# Patient Record
Sex: MALE | Race: WHITE | Employment: FULL TIME | URBAN - METROPOLITAN AREA
[De-identification: names, ages, dates, MRNs, and addresses within clinical notes are randomized per-mention and may not be internally consistent; named-entity substitution may affect disease eponyms.]

---

## 2022-05-06 ENCOUNTER — HOSPITAL ENCOUNTER (EMERGENCY)
Age: 47
Discharge: HOME | End: 2022-05-07
Payer: COMMERCIAL

## 2022-05-06 VITALS
TEMPERATURE: 98.1 F | HEIGHT: 71 IN | RESPIRATION RATE: 20 BRPM | WEIGHT: 315 LBS | OXYGEN SATURATION: 94 % | SYSTOLIC BLOOD PRESSURE: 145 MMHG | HEART RATE: 99 BPM | BODY MASS INDEX: 44.1 KG/M2 | DIASTOLIC BLOOD PRESSURE: 89 MMHG

## 2022-05-06 DIAGNOSIS — S81.011A LACERATION OF RIGHT KNEE, INITIAL ENCOUNTER: Primary | ICD-10-CM

## 2022-05-06 PROCEDURE — 99284 EMERGENCY DEPT VISIT MOD MDM: CPT

## 2022-05-06 PROCEDURE — 90715 TDAP VACCINE 7 YRS/> IM: CPT | Performed by: NURSE PRACTITIONER

## 2022-05-06 PROCEDURE — 6360000002 HC RX W HCPCS: Performed by: NURSE PRACTITIONER

## 2022-05-06 PROCEDURE — 12002 RPR S/N/AX/GEN/TRNK2.6-7.5CM: CPT

## 2022-05-06 PROCEDURE — 90471 IMMUNIZATION ADMIN: CPT | Performed by: NURSE PRACTITIONER

## 2022-05-06 RX ORDER — VENLAFAXINE HYDROCHLORIDE 150 MG/1
150 CAPSULE, EXTENDED RELEASE ORAL DAILY
COMMUNITY

## 2022-05-06 RX ORDER — ARIPIPRAZOLE 10 MG/1
10 TABLET ORAL DAILY
COMMUNITY

## 2022-05-06 RX ORDER — ALPRAZOLAM 1 MG/1
1 TABLET ORAL 2 TIMES DAILY
COMMUNITY

## 2022-05-06 RX ADMIN — TETANUS TOXOID, REDUCED DIPHTHERIA TOXOID AND ACELLULAR PERTUSSIS VACCINE, ADSORBED 0.5 ML: 5; 2.5; 8; 8; 2.5 SUSPENSION INTRAMUSCULAR at 23:19

## 2022-05-06 NOTE — Clinical Note
Nick Alvarez was seen and treated in our emergency department on 5/6/2022. He may return to work on 05/08/2022. If you have any questions or concerns, please don't hesitate to call.       Natalie Vallejo, ROHAN - CNP

## 2022-05-07 ASSESSMENT — ENCOUNTER SYMPTOMS
CHEST TIGHTNESS: 0
VOMITING: 0
SHORTNESS OF BREATH: 0
DIARRHEA: 0
NAUSEA: 0
ABDOMINAL PAIN: 0

## 2022-05-07 NOTE — ED NOTES
Discharge and education instructions reviewed. Patient verbalized understanding, teach-back successful. Patient denied questions at this time. No acute distress noted. Patient instructed to follow-up as noted - return to emergency department if symptoms worsen. Patient verbalized understanding. Discharged per EDMD with discharged instructions. Patient discharged with knee immobilizer on R knee.        Margi Luo RN  05/06/22 6169

## 2022-05-07 NOTE — ED PROVIDER NOTES
[address] [facility]        Pt Name: Darren Molina  MRN: [id number]  [place] 1975  Date of evaluation: 5/6/2022  Provider: ROHAN Belle CNP  PCP: No primary care provider on file. Note Started: 1:18 AM EDT       ARLENE. I have evaluated this patient. My supervising physician was available for consultation. CHIEF COMPLAINT       Chief Complaint   Patient presents with    Laceration     pt hit right knee on a glass coffee table and has a laceration to knee        HISTORY OF PRESENT ILLNESS   (Location, Timing/Onset, Context/Setting, Quality, Duration, Modifying Factors, Severity, Associated Signs and Symptoms)  Note limiting factors. Chief Complaint: laceration to the right knee     Darren Molina is a 40 y.o. male who presents to the emergency department with 5 cm laceration to the right knee. The patient reports that he bumped into a glass coffee table, causing a jagged edge and causing laceration. No concern for foreign body. Uncertain of tetanus status. Bleeding is controlled. Mild to no pain. Laceration is gaping. He is ambulatory    Denies any headache, fever, lightheadedness, dizziness, visual disturbances. No chest pain or pressure. No neck or back pain. No shortness of breath, cough, or congestion. No abdominal pain, nausea, vomiting, diarrhea, constipation, or dysuria. No rash. Nursing Notes were all reviewed and agreed with or any disagreements were addressed in the HPI. REVIEW OF SYSTEMS    (2-9 systems for level 4, 10 or more for level 5)     Review of Systems   Constitutional: Negative for activity change, chills and fever. Respiratory: Negative for chest tightness and shortness of breath. Cardiovascular: Negative for chest pain. Gastrointestinal: Negative for abdominal pain, diarrhea, nausea and vomiting. Genitourinary: Negative for dysuria. Skin: Positive for wound.    All other systems reviewed and are negative. Positives and Pertinent negatives as per HPI. Except as noted above in the ROS, all other systems were reviewed and negative. PAST MEDICAL HISTORY     Past Medical History:   Diagnosis Date    Bipolar 1 disorder ([facility] [address]     Depression          SURGICAL HISTORY   History reviewed. No pertinent surgical history. [address]       Discharge Medication List as of 5/6/2022 11:53 PM      CONTINUE these medications which have NOT CHANGED    Details   ARIPiprazole (ABILIFY) 10 MG tablet Take 10 mg by mouth dailyHistorical Med      venlafaxine (EFFEXOR XR) 150 MG extended release capsule Take 150 mg by mouth dailyHistorical Med      ALPRAZolam (XANAX) 1 MG tablet Take 1 mg by mouth 2 times daily. Historical Med               ALLERGIES     Patient has no known allergies. FAMILYHISTORY     History reviewed. No pertinent family history. SOCIAL HISTORY       Social History     Tobacco Use    Smoking status: Current Every Day Smoker     Packs/day: 2.00     Types: Cigarettes    Smokeless tobacco: Never Used   Substance Use Topics    Alcohol use: Not Currently    Drug use: Never       SCREENINGS    Story Coma Scale  Eye Opening: Spontaneous  Best Verbal Response: Oriented  Best Motor Response: Obeys commands  Maeve Coma Scale Score: 15        PHYSICAL EXAM    (up to 7 for level 4, 8 or more for level 5)     ED Triage Vitals [05/06/22 2254]   BP Temp Temp Source Pulse Resp SpO2 Height Weight   (!) 145/89 98.1 °F (36.7 °C) Oral 99 20 94 % 5' 11\" (1.803 m) (!) 347 lb (157.4 kg)       Physical Exam  Vitals and nursing note reviewed. Constitutional:       Appearance: He is well-developed. He is not diaphoretic. HENT:      Head: Normocephalic and atraumatic. Right Ear: External ear normal.      Left Ear: External ear normal.   Eyes:      General:         Right eye: No discharge. Left eye: No discharge. Neck:      Vascular: No JVD.    Cardiovascular: Rate and Rhythm: Normal rate and regular rhythm. Pulses: Normal pulses. Heart sounds: Normal heart sounds. Pulmonary:      Effort: Pulmonary effort is normal. No respiratory distress. Breath sounds: Normal breath sounds. Abdominal:      Palpations: Abdomen is soft. Musculoskeletal:         General: Normal range of motion. Cervical back: Normal range of motion and neck supple. Skin:     General: Skin is warm and dry. Coloration: Skin is not pale. Neurological:      Mental Status: He is alert and oriented to person, place, and time. Psychiatric:         Behavior: Behavior normal.         DIAGNOSTIC RESULTS   LABS:    Labs Reviewed - No data to display    When ordered only abnormal lab results are displayed. All other labs were within normal range or not returned as of this dictation. EKG: When ordered, EKG's are interpreted by the Emergency Department Physician in the absence of a cardiologist.  Please see their note for interpretation of EKG. RADIOLOGY:   Non-plain film images such as CT, Ultrasound and MRI are read by the radiologist. Plain radiographic images are visualized and preliminarily interpreted by the ED Provider with the below findings:        Interpretation per the Radiologist below, if available at the time of this note:    No orders to display     No results found. PROCEDURES   Unless otherwise noted below, none     Lac Repair    Date/Time: 5/7/2022 1:19 AM  Performed by: ROHAN Rainey CNP  Authorized by: ROHAN Rainey CNP     Consent:     Consent obtained:  Verbal    Consent given by:  Patient    Risks discussed:  Infection, pain, retained foreign body, tendon damage, need for additional repair and poor wound healing  Anesthesia (see MAR for exact dosages):      Anesthesia method:  Local infiltration    Local anesthetic:  Lidocaine 2% WITH epi  Laceration details:     Location:  Leg    Leg location:  R knee    Length (cm): 5  Repair type:     Repair type:  Simple  Pre-procedure details:     Preparation:  Patient was prepped and draped in usual sterile fashion  Exploration:     Wound exploration: wound explored through full range of motion      Contaminated: no    Treatment:     Area cleansed with:  Hibiclens    Amount of cleaning:  Standard    Irrigation solution:  Sterile saline  Skin repair:     Repair method:  Sutures    Suture size:  4-0    Suture material:  Nylon    Suture technique:  Simple interrupted    Number of sutures:  7  Approximation:     Approximation:  Close  Post-procedure details:     Dressing:  Antibiotic ointment, bulky dressing and splint for protection    Patient tolerance of procedure: Tolerated well, no immediate complications        CRITICAL CARE TIME       CONSULTS:  None      EMERGENCY DEPARTMENT COURSE and DIFFERENTIAL DIAGNOSIS/MDM:   Vitals:    Vitals:    05/06/22 2254   BP: (!) 145/89   Pulse: 99   Resp: 20   Temp: 98.1 °F (36.7 °C)   TempSrc: Oral   SpO2: 94%   Weight: (!) 347 lb (157.4 kg)   Height: 5' 11\" (1.803 m)       Patient was given the following medications:  Medications   lidocaine-EPINEPHrine 1 percent-1:433410 injection 20 mL (has no administration in time range)   Tetanus-Diphth-Acell Pertussis (BOOSTRIX) injection 0.5 mL (0.5 mLs IntraMUSCular Given 5/6/22 1319)           Briefly, this is a 45-year-old male who presents to the emergency department with laceration to the right knee. The laceration was easily repaired by myself and splint placed for protection. Suture should be removed in 10 days. Tetanus updated. I estimate there is LOW risk for COMPARTMENT SYNDROME, TENDON OR NEUROVASCULAR INJURY, FOREIGN BODY OR signs of INFECTION thus I consider the discharge disposition reasonable. FINAL IMPRESSION      1.  Laceration of right knee, initial encounter          DISPOSITION/PLAN   DISPOSITION Decision To Discharge 05/06/2022 11:48:25 PM      PATIENT REFERRED TO:  No follow-up provider specified.     DISCHARGE MEDICATIONS:  Discharge Medication List as of 5/6/2022 11:53 PM          DISCONTINUED MEDICATIONS:  Discharge Medication List as of 5/6/2022 11:53 PM                 (Please note that portions of this note were completed with a voice recognition program.  Efforts were made to edit the dictations but occasionally words are mis-transcribed.)    ROHAN Landaverde CNP (electronically signed)           ROHAN Landaverde CNP  05/07/22 0121

## 2025-06-05 ENCOUNTER — TELEPHONE (OUTPATIENT)
Dept: CARDIOLOGY CLINIC | Age: 50
End: 2025-06-05

## 2025-06-05 NOTE — TELEPHONE ENCOUNTER
LVM for patient to call and schedule echo and stress test at [place].     Orders from [facility] in media.     Once scheduled, we will attach order in Epic.     Thank you.

## 2025-06-05 NOTE — TELEPHONE ENCOUNTER
Patient called back in, scheduled stress and echo for 06/27 at 930/1030.   Sent patient email with location and time information.   Call ended.

## 2025-06-27 ENCOUNTER — TELEPHONE (OUTPATIENT)
Dept: CARDIOLOGY CLINIC | Age: 50
End: 2025-06-27

## 2025-06-27 DIAGNOSIS — R06.02 SHORTNESS OF BREATH: Primary | ICD-10-CM

## 2025-06-27 NOTE — TELEPHONE ENCOUNTER
Order has been placed in [facility].     [facility] for patient to call for scheduling options with Nuc Stress, if have a day/time preference.     Thank you.    slept well, last night. took his am amoxicillin. remains calm.

## 2025-06-27 NOTE — TELEPHONE ENCOUNTER
Patient was unwilling to attempt plain GXT. He would like to have Nuc Stress done at [facility]. Can you place order and I will schedule?     Thank you.